# Patient Record
Sex: MALE | URBAN - METROPOLITAN AREA
[De-identification: names, ages, dates, MRNs, and addresses within clinical notes are randomized per-mention and may not be internally consistent; named-entity substitution may affect disease eponyms.]

---

## 2020-07-31 ENCOUNTER — OUT OF OFFICE VISIT (OUTPATIENT)
Dept: URBAN - METROPOLITAN AREA MEDICAL CENTER 9 | Facility: MEDICAL CENTER | Age: 25
End: 2020-07-31
Payer: COMMERCIAL

## 2020-07-31 DIAGNOSIS — Q90.9 DOWN SYNDROME: ICD-10-CM

## 2020-07-31 DIAGNOSIS — K85.10 ACUTE BILIARY PANCREATITIS: ICD-10-CM

## 2020-07-31 DIAGNOSIS — K80.50 BILE DUCT CALCULUS: ICD-10-CM

## 2020-07-31 PROCEDURE — 99222 1ST HOSP IP/OBS MODERATE 55: CPT | Performed by: INTERNAL MEDICINE

## 2020-07-31 PROCEDURE — G8427 DOCREV CUR MEDS BY ELIG CLIN: HCPCS | Performed by: INTERNAL MEDICINE

## 2020-07-31 PROCEDURE — 99232 SBSQ HOSP IP/OBS MODERATE 35: CPT | Performed by: INTERNAL MEDICINE

## 2020-08-01 ENCOUNTER — OUT OF OFFICE VISIT (OUTPATIENT)
Dept: URBAN - METROPOLITAN AREA MEDICAL CENTER 9 | Facility: MEDICAL CENTER | Age: 25
End: 2020-08-01
Payer: COMMERCIAL

## 2020-08-01 DIAGNOSIS — K80.50 BILE DUCT CALCULUS: ICD-10-CM

## 2020-08-01 DIAGNOSIS — Q90.9 DOWN SYNDROME: ICD-10-CM

## 2020-08-01 DIAGNOSIS — K85.10 ACUTE BILIARY PANCREATITIS: ICD-10-CM

## 2020-08-01 PROCEDURE — 99232 SBSQ HOSP IP/OBS MODERATE 35: CPT | Performed by: INTERNAL MEDICINE

## 2020-11-02 ENCOUNTER — OFFICE VISIT (OUTPATIENT)
Dept: URBAN - METROPOLITAN AREA TELEHEALTH 2 | Facility: TELEHEALTH | Age: 25
End: 2020-11-02
Payer: COMMERCIAL

## 2020-11-02 ENCOUNTER — DASHBOARD ENCOUNTERS (OUTPATIENT)
Age: 25
End: 2020-11-02

## 2020-11-02 DIAGNOSIS — Z90.49 HISTORY OF CHOLECYSTECTOMY: ICD-10-CM

## 2020-11-02 DIAGNOSIS — K85.90 PANCREATITIS: ICD-10-CM

## 2020-11-02 DIAGNOSIS — K80.50 BILE DUCT CALCULUS: ICD-10-CM

## 2020-11-02 PROCEDURE — 99213 OFFICE O/P EST LOW 20 MIN: CPT | Performed by: INTERNAL MEDICINE

## 2020-11-02 PROCEDURE — G8427 DOCREV CUR MEDS BY ELIG CLIN: HCPCS | Performed by: INTERNAL MEDICINE

## 2020-11-02 RX ORDER — ALBUTEROL SULFATE 2.5 MG/3ML
3 ML AS NEEDED SOLUTION RESPIRATORY (INHALATION)
Status: ACTIVE | COMMUNITY

## 2020-11-02 NOTE — HPI-TODAY'S VISIT:
Mr. Rm is a 25 year old white male who presents for telehealth visit, following admission to [facility] on 7/30/2020 for acute pancreatitis. Seen by [facility] providers for consultation and treatment. The patient with a known history of Down's syndrome, Asthma, Congenital Heart Disease, S/P Cardiac surgery, SHADIA, Cholelithiasis, and Obesity who was brought in by his mother to ED for abdominal pain, nausea,  and vomiting. Patient was not able to give any history and mostly history obtained from his mother at bedside. Upon initial evaluation in ER patient found to have elevated lipase of 2988 concerning for acute pancreatitis. He has a known history of gallstones for the last year and she was planning to get the surgery done. However, it was not completed until recently. No further nausea and vomiting while at the hospital. The patient's mother denies dyspepsia, dysphagia, odynophagia, hemoptysis, hematemesis, regurgitation, melena, constipation, diarrhea, hematochezia, fever, chills, chest pain, SOB, or any other GI complaints today. No endoscopy procedures prior to admisison. No history of ETOH, Tobacco, and illicit drug use. Due to biliary obstruction/CBD stones, he did have ERCP by Dr. Enrique Garcia on 8/3/20.  Findings included: Esophagus: limited examination, Stomach: limited examination, and Duodenum: The ampulla was identified Deep biliary cannulation achieved. A cholangiogram showed filling defects measuring 8-9 mm in the bile duct. The gall bladder was distended and was flll of stones. . A 7F 9 cm stent was placed. Following proedure, no apparent complications. Plan was made for cholecystectomy as he did have  US with cholelithiasis and CBD at upper limits of normal size at 5.9 mm. An MRCP with cholelithiasis with choledocholithiasis There was no significant dilatation of the extrahepatic biliary ductal system Pancreatic duct is nondilated. He had lap curt by Dr. Paxton Gannon 8/4/20. He returns for follow up, speaking with his mother via telephone to plan for ERCP as an outptient to remove the stent and clear the duct. Overall, patient doing well from GI standpoint, per mother. Monitoring his diet for bowel habit changes and to be at optimal weight.

## 2020-11-03 ENCOUNTER — TELEPHONE ENCOUNTER (OUTPATIENT)
Dept: URBAN - METROPOLITAN AREA CLINIC 40 | Facility: CLINIC | Age: 25
End: 2020-11-03

## 2021-02-22 ENCOUNTER — TELEPHONE ENCOUNTER (OUTPATIENT)
Dept: URBAN - METROPOLITAN AREA SURGERY CENTER 30 | Facility: SURGERY CENTER | Age: 26
End: 2021-02-22

## 2021-03-01 ENCOUNTER — TELEPHONE ENCOUNTER (OUTPATIENT)
Dept: URBAN - METROPOLITAN AREA CLINIC 40 | Facility: CLINIC | Age: 26
End: 2021-03-01

## 2021-03-02 ENCOUNTER — TELEPHONE ENCOUNTER (OUTPATIENT)
Dept: URBAN - METROPOLITAN AREA CLINIC 80 | Facility: CLINIC | Age: 26
End: 2021-03-02

## 2021-03-17 PROBLEM — 428882003 HISTORY OF CHOLECYSTECTOMY: Status: ACTIVE | Noted: 2020-11-02

## 2021-03-19 ENCOUNTER — LAB OUTSIDE AN ENCOUNTER (OUTPATIENT)
Dept: URBAN - METROPOLITAN AREA CLINIC 40 | Facility: CLINIC | Age: 26
End: 2021-03-19

## 2021-03-19 ENCOUNTER — OFFICE VISIT (OUTPATIENT)
Dept: URBAN - METROPOLITAN AREA MEDICAL CENTER 28 | Facility: MEDICAL CENTER | Age: 26
End: 2021-03-19
Payer: COMMERCIAL

## 2021-03-19 ENCOUNTER — TELEPHONE ENCOUNTER (OUTPATIENT)
Dept: URBAN - METROPOLITAN AREA CLINIC 74 | Facility: CLINIC | Age: 26
End: 2021-03-19

## 2021-03-19 DIAGNOSIS — K80.50 BILE DUCT CALCULUS: ICD-10-CM

## 2021-03-19 DIAGNOSIS — Z46.59 ENCOUNTER FOR FITTING AND ADJUSTMENT OF OTHER GASTROINTESTINAL APPLIANCE AND DEVICE: ICD-10-CM

## 2021-03-19 PROCEDURE — 43264 ERCP REMOVE DUCT CALCULI: CPT | Performed by: STUDENT IN AN ORGANIZED HEALTH CARE EDUCATION/TRAINING PROGRAM

## 2021-03-19 PROCEDURE — 43275 ERCP REMOVE FORGN BODY DUCT: CPT | Performed by: STUDENT IN AN ORGANIZED HEALTH CARE EDUCATION/TRAINING PROGRAM

## 2021-03-19 RX ORDER — ALBUTEROL SULFATE 2.5 MG/3ML
3 ML AS NEEDED SOLUTION RESPIRATORY (INHALATION)
Status: ACTIVE | COMMUNITY